# Patient Record
Sex: MALE | Employment: OTHER | URBAN - METROPOLITAN AREA
[De-identification: names, ages, dates, MRNs, and addresses within clinical notes are randomized per-mention and may not be internally consistent; named-entity substitution may affect disease eponyms.]

---

## 2020-01-03 ENCOUNTER — APPOINTMENT (OUTPATIENT)
Dept: GENERAL RADIOLOGY | Facility: CLINIC | Age: 30
End: 2020-01-03
Attending: NURSE PRACTITIONER

## 2020-01-03 ENCOUNTER — HOSPITAL ENCOUNTER (EMERGENCY)
Facility: CLINIC | Age: 30
Discharge: HOME | End: 2020-01-03
Attending: NURSE PRACTITIONER | Admitting: NURSE PRACTITIONER

## 2020-01-03 VITALS
TEMPERATURE: 97 F | SYSTOLIC BLOOD PRESSURE: 155 MMHG | DIASTOLIC BLOOD PRESSURE: 85 MMHG | RESPIRATION RATE: 18 BRPM | OXYGEN SATURATION: 98 % | HEART RATE: 65 BPM

## 2020-01-03 DIAGNOSIS — S69.90XA THUMB INJURY: ICD-10-CM

## 2020-01-03 DIAGNOSIS — T14.8XXA PUNCTURE WOUND: ICD-10-CM

## 2020-01-03 PROCEDURE — 25000128 H RX IP 250 OP 636: Performed by: NURSE PRACTITIONER

## 2020-01-03 PROCEDURE — 90471 IMMUNIZATION ADMIN: CPT

## 2020-01-03 PROCEDURE — 25000132 ZZH RX MED GY IP 250 OP 250 PS 637: Performed by: NURSE PRACTITIONER

## 2020-01-03 PROCEDURE — 73140 X-RAY EXAM OF FINGER(S): CPT | Mod: LT

## 2020-01-03 PROCEDURE — 90715 TDAP VACCINE 7 YRS/> IM: CPT | Performed by: NURSE PRACTITIONER

## 2020-01-03 PROCEDURE — 99284 EMERGENCY DEPT VISIT MOD MDM: CPT

## 2020-01-03 RX ORDER — HYDROCODONE BITARTRATE AND ACETAMINOPHEN 5; 325 MG/1; MG/1
2 TABLET ORAL ONCE
Status: COMPLETED | OUTPATIENT
Start: 2020-01-03 | End: 2020-01-03

## 2020-01-03 RX ORDER — CEPHALEXIN 500 MG/1
500 CAPSULE ORAL 4 TIMES DAILY
Qty: 28 CAPSULE | Refills: 0 | Status: SHIPPED | OUTPATIENT
Start: 2020-01-03 | End: 2020-01-10

## 2020-01-03 RX ORDER — CEPHALEXIN 500 MG/1
500 CAPSULE ORAL ONCE
Status: COMPLETED | OUTPATIENT
Start: 2020-01-03 | End: 2020-01-03

## 2020-01-03 RX ADMIN — CLOSTRIDIUM TETANI TOXOID ANTIGEN (FORMALDEHYDE INACTIVATED), CORYNEBACTERIUM DIPHTHERIAE TOXOID ANTIGEN (FORMALDEHYDE INACTIVATED), BORDETELLA PERTUSSIS TOXOID ANTIGEN (GLUTARALDEHYDE INACTIVATED), BORDETELLA PERTUSSIS FILAMENTOUS HEMAGGLUTININ ANTIGEN (FORMALDEHYDE INACTIVATED), BORDETELLA PERTUSSIS PERTACTIN ANTIGEN, AND BORDETELLA PERTUSSIS FIMBRIAE 2/3 ANTIGEN 0.5 ML: 5; 2; 2.5; 5; 3; 5 INJECTION, SUSPENSION INTRAMUSCULAR at 15:46

## 2020-01-03 RX ADMIN — HYDROCODONE BITARTRATE AND ACETAMINOPHEN 2 TABLET: 5; 325 TABLET ORAL at 15:46

## 2020-01-03 RX ADMIN — CEPHALEXIN 500 MG: 500 CAPSULE ORAL at 16:04

## 2020-01-03 ASSESSMENT — ENCOUNTER SYMPTOMS
COLOR CHANGE: 0
JOINT SWELLING: 1
ARTHRALGIAS: 1
WOUND: 1
NUMBNESS: 0
WEAKNESS: 0

## 2020-01-03 NOTE — ED TRIAGE NOTES
"ABCs intact. Pt screwed a screw into his L thumb. Pt states when he pulled it out there were \"bone fragments\". Last tetanus 1995.  "

## 2020-01-03 NOTE — ED AVS SNAPSHOT
[facility] Emergency Department  [address]  [facility] [address]  Phone:  [phone]  Fax:  [phone]                                    Jackson Ivan   MRN: [id number]    Department:  [facility] Emergency Department   Date of Visit:  1/3/2020           After Visit Summary Signature Page    I have received my discharge instructions, and my questions have been answered. I have discussed any challenges I see with this plan with the nurse or doctor.    ..........................................................................................................................................  Patient/Patient Representative Signature      ..........................................................................................................................................  Patient Representative Print Name and Relationship to Patient    ..................................................               ................................................  Date                                   Time    ..........................................................................................................................................  Reviewed by Signature/Title    ...................................................              ..............................................  Date                                               Time          22EPIC Rev 08/18

## 2020-01-03 NOTE — DISCHARGE INSTRUCTIONS
Ibuprofen 600 mg every six hours for pain.  Return to ED if signs of infection as we discussed.  Where splint until follow up with orthopedics

## 2021-11-14 ENCOUNTER — APPOINTMENT (OUTPATIENT)
Dept: GENERAL RADIOLOGY | Facility: CLINIC | Age: 31
End: 2021-11-14
Attending: EMERGENCY MEDICINE

## 2021-11-14 ENCOUNTER — HOSPITAL ENCOUNTER (EMERGENCY)
Facility: CLINIC | Age: 31
Discharge: HOME | End: 2021-11-15
Attending: EMERGENCY MEDICINE | Admitting: EMERGENCY MEDICINE

## 2021-11-14 ENCOUNTER — APPOINTMENT (OUTPATIENT)
Dept: CT IMAGING | Facility: CLINIC | Age: 31
End: 2021-11-14
Attending: EMERGENCY MEDICINE

## 2021-11-14 DIAGNOSIS — J18.9 PNEUMONIA OF RIGHT UPPER LOBE DUE TO INFECTIOUS ORGANISM: ICD-10-CM

## 2021-11-14 LAB
ANION GAP SERPL CALCULATED.3IONS-SCNC: 6 MMOL/L (ref 3–14)
BASOPHILS # BLD AUTO: 0.1 10E3/UL (ref 0–0.2)
BASOPHILS NFR BLD AUTO: 1 %
BUN SERPL-MCNC: 18 MG/DL (ref 7–30)
CALCIUM SERPL-MCNC: 8.4 MG/DL (ref 8.5–10.1)
CHLORIDE BLD-SCNC: 103 MMOL/L (ref 94–109)
CO2 SERPL-SCNC: 27 MMOL/L (ref 20–32)
CREAT SERPL-MCNC: 0.78 MG/DL (ref 0.66–1.25)
EOSINOPHIL # BLD AUTO: 0.1 10E3/UL (ref 0–0.7)
EOSINOPHIL NFR BLD AUTO: 1 %
ERYTHROCYTE [DISTWIDTH] IN BLOOD BY AUTOMATED COUNT: 11.8 % (ref 10–15)
GFR SERPL CREATININE-BSD FRML MDRD: >90 ML/MIN/1.73M2
GLUCOSE BLD-MCNC: 126 MG/DL (ref 70–99)
HCT VFR BLD AUTO: 40.5 % (ref 40–53)
HGB BLD-MCNC: 13.4 G/DL (ref 13.3–17.7)
IMM GRANULOCYTES # BLD: 0 10E3/UL
IMM GRANULOCYTES NFR BLD: 0 %
LYMPHOCYTES # BLD AUTO: 3.2 10E3/UL (ref 0.8–5.3)
LYMPHOCYTES NFR BLD AUTO: 32 %
MCH RBC QN AUTO: 28.4 PG (ref 26.5–33)
MCHC RBC AUTO-ENTMCNC: 33.1 G/DL (ref 31.5–36.5)
MCV RBC AUTO: 86 FL (ref 78–100)
MONOCYTES # BLD AUTO: 0.7 10E3/UL (ref 0–1.3)
MONOCYTES NFR BLD AUTO: 7 %
NEUTROPHILS # BLD AUTO: 6 10E3/UL (ref 1.6–8.3)
NEUTROPHILS NFR BLD AUTO: 59 %
NRBC # BLD AUTO: 0 10E3/UL
NRBC BLD AUTO-RTO: 0 /100
PLATELET # BLD AUTO: 349 10E3/UL (ref 150–450)
POTASSIUM BLD-SCNC: 3.4 MMOL/L (ref 3.4–5.3)
RBC # BLD AUTO: 4.72 10E6/UL (ref 4.4–5.9)
SODIUM SERPL-SCNC: 136 MMOL/L (ref 133–144)
TROPONIN I SERPL-MCNC: <0.015 UG/L (ref 0–0.04)
WBC # BLD AUTO: 10.2 10E3/UL (ref 4–11)

## 2021-11-14 PROCEDURE — 250N000009 HC RX 250: Performed by: EMERGENCY MEDICINE

## 2021-11-14 PROCEDURE — 71045 X-RAY EXAM CHEST 1 VIEW: CPT

## 2021-11-14 PROCEDURE — 99285 EMERGENCY DEPT VISIT HI MDM: CPT | Mod: 25

## 2021-11-14 PROCEDURE — 93005 ELECTROCARDIOGRAM TRACING: CPT

## 2021-11-14 PROCEDURE — 85025 COMPLETE CBC W/AUTO DIFF WBC: CPT | Performed by: EMERGENCY MEDICINE

## 2021-11-14 PROCEDURE — 250N000013 HC RX MED GY IP 250 OP 250 PS 637: Performed by: EMERGENCY MEDICINE

## 2021-11-14 PROCEDURE — 80048 BASIC METABOLIC PNL TOTAL CA: CPT | Performed by: EMERGENCY MEDICINE

## 2021-11-14 PROCEDURE — 36415 COLL VENOUS BLD VENIPUNCTURE: CPT | Performed by: EMERGENCY MEDICINE

## 2021-11-14 PROCEDURE — 84484 ASSAY OF TROPONIN QUANT: CPT | Performed by: EMERGENCY MEDICINE

## 2021-11-14 PROCEDURE — 250N000011 HC RX IP 250 OP 636: Performed by: EMERGENCY MEDICINE

## 2021-11-14 PROCEDURE — 71275 CT ANGIOGRAPHY CHEST: CPT

## 2021-11-14 PROCEDURE — C9803 HOPD COVID-19 SPEC COLLECT: HCPCS

## 2021-11-14 PROCEDURE — 87636 SARSCOV2 & INF A&B AMP PRB: CPT | Performed by: EMERGENCY MEDICINE

## 2021-11-14 RX ORDER — ACETAMINOPHEN 500 MG
1000 TABLET ORAL ONCE
Status: COMPLETED | OUTPATIENT
Start: 2021-11-14 | End: 2021-11-14

## 2021-11-14 RX ORDER — LEVOFLOXACIN 500 MG/1
500 TABLET, FILM COATED ORAL ONCE
Status: COMPLETED | OUTPATIENT
Start: 2021-11-15 | End: 2021-11-15

## 2021-11-14 RX ORDER — IOPAMIDOL 755 MG/ML
500 INJECTION, SOLUTION INTRAVASCULAR ONCE
Status: COMPLETED | OUTPATIENT
Start: 2021-11-14 | End: 2021-11-14

## 2021-11-14 RX ADMIN — ACETAMINOPHEN 1000 MG: 500 TABLET, FILM COATED ORAL at 21:59

## 2021-11-14 RX ADMIN — IOPAMIDOL 70 ML: 755 INJECTION, SOLUTION INTRAVENOUS at 22:11

## 2021-11-14 RX ADMIN — SODIUM CHLORIDE 85 ML: 9 INJECTION, SOLUTION INTRAVENOUS at 22:11

## 2021-11-15 VITALS
RESPIRATION RATE: 24 BRPM | OXYGEN SATURATION: 94 % | HEART RATE: 62 BPM | DIASTOLIC BLOOD PRESSURE: 65 MMHG | SYSTOLIC BLOOD PRESSURE: 112 MMHG | TEMPERATURE: 98.2 F

## 2021-11-15 LAB
ATRIAL RATE - MUSE: 86 BPM
DIASTOLIC BLOOD PRESSURE - MUSE: NORMAL MMHG
FLUAV RNA SPEC QL NAA+PROBE: NEGATIVE
FLUBV RNA RESP QL NAA+PROBE: NEGATIVE
INTERPRETATION ECG - MUSE: NORMAL
P AXIS - MUSE: 68 DEGREES
PR INTERVAL - MUSE: 132 MS
QRS DURATION - MUSE: 84 MS
QT - MUSE: 334 MS
QTC - MUSE: 399 MS
R AXIS - MUSE: 66 DEGREES
SARS-COV-2 RNA RESP QL NAA+PROBE: NEGATIVE
SYSTOLIC BLOOD PRESSURE - MUSE: NORMAL MMHG
T AXIS - MUSE: 44 DEGREES
VENTRICULAR RATE- MUSE: 86 BPM

## 2021-11-15 PROCEDURE — 250N000013 HC RX MED GY IP 250 OP 250 PS 637: Performed by: EMERGENCY MEDICINE

## 2021-11-15 RX ORDER — ONDANSETRON 4 MG/1
4 TABLET, ORALLY DISINTEGRATING ORAL EVERY 8 HOURS PRN
Qty: 10 TABLET | Refills: 0 | Status: SHIPPED | OUTPATIENT
Start: 2021-11-15 | End: 2021-11-18

## 2021-11-15 RX ORDER — LEVOFLOXACIN 500 MG/1
500 TABLET, FILM COATED ORAL DAILY
Qty: 6 TABLET | Refills: 0 | Status: SHIPPED | OUTPATIENT
Start: 2021-11-15 | End: 2021-11-21

## 2021-11-15 RX ADMIN — LEVOFLOXACIN 500 MG: 500 TABLET, FILM COATED ORAL at 00:16

## 2021-11-15 NOTE — ED PROVIDER NOTES
History     Chief Complaint:    Shortness of Breath    History obtained via professional .  Discharge plan discussed with wife, who is proficient in English speaking and declines an .    MARY Ivan is a 31 year old male who presents for evaluation of cough, shortness of breath, myalgias.  The patient notes that 1 week ago he began to have body aches, malaise, fevers and chills, and cough.  He subsequently also developed shortness of breath and occasional posttussive emesis which is nonbloody and nonbilious.  Contrary to the triage note, the patient denies any black stools but notes darker brown stools and occasionally darker brown phlegm.  He notes a family with similar symptoms months ago who was tested and did not have COVID-19; his wife and 5 kids are asymptomatic.  Endorse chest pain with cough only.  He denies any other concerns.    Review of Systems  General: Positive as per above  Respiratory: Positive as per above  CV: Positive as per above  GI: Positive as per above  All other systems reviewed negative    Allergies:  No Known Allergies      Medications:    None    Past Medical History:    None    Past Surgical History:    None    Family History:    Family member ill with similar symptoms    Social History:  Presents alone    Physical Exam     Patient Vitals for the past 24 hrs:   BP Temp Temp src Pulse Resp SpO2   11/14/21 2100 (!) 145/90 -- -- 101 -- 100 %   11/14/21 2051 (!) 142/84 98.2  F (36.8  C) Temporal 94 24 99 %       Physical Exam  Constitutional: Alert, attentive, frequently coughing  HENT:    Nose: Nose normal.    Mouth/Throat: Oropharynx is clear, mucous membranes are moist   Eyes: EOM are normal.   CV: tachycardic, regular rhythm; no murmurs, rubs or gallups  Chest: Effort normal and breath sounds normal.   GI:  There is no tenderness. No distension. Normal bowel sounds  MSK: Normal range of motion.   Neurological: Alert, attentive  Skin: Skin is warm and  dry.      Emergency Department Course   ECG:  NSR with sinus arrhythmia, no ST/T abnormality, no STEMI, no old EKG for comparison    Results for orders placed or performed during the hospital encounter of 11/14/21 (from the past 24 hour(s))   EKG 12 lead   Result Value Ref Range    Systolic Blood Pressure  mmHg    Diastolic Blood Pressure  mmHg    Ventricular Rate 86 BPM    Atrial Rate 86 BPM    NJ Interval 132 ms    QRS Duration 84 ms     ms    QTc 399 ms    P Axis 68 degrees    R AXIS 66 degrees    T Axis 44 degrees    Interpretation ECG       Sinus rhythm with sinus arrhythmia  Normal ECG  No previous ECGs available     CBC + differential    Narrative    The following orders were created for panel order CBC + differential.  Procedure                               Abnormality         Status                     ---------                               -----------         ------                     CBC with platelets and d...[[id number]]                      Final result                 Please view results for these tests on the individual orders.   Basic metabolic panel (BMP)   Result Value Ref Range    Sodium 136 133 - 144 mmol/L    Potassium 3.4 3.4 - 5.3 mmol/L    Chloride 103 94 - 109 mmol/L    Carbon Dioxide (CO2) 27 20 - 32 mmol/L    Anion Gap 6 3 - 14 mmol/L    Urea Nitrogen 18 7 - 30 mg/dL    Creatinine 0.78 0.66 - 1.25 mg/dL    Calcium 8.4 (L) 8.5 - 10.1 mg/dL    Glucose 126 (H) 70 - 99 mg/dL    GFR Estimate >90 >60 mL/min/1.73m2   Troponin I (now)   Result Value Ref Range    Troponin I <0.015 0.000 - 0.045 ug/L   CBC with platelets and differential   Result Value Ref Range    WBC Count 10.2 4.0 - 11.0 10e3/uL    RBC Count 4.72 4.40 - 5.90 10e6/uL    Hemoglobin 13.4 13.3 - 17.7 g/dL    Hematocrit 40.5 40.0 - 53.0 %    MCV 86 78 - 100 fL    MCH 28.4 26.5 - 33.0 pg    MCHC 33.1 31.5 - 36.5 g/dL    RDW 11.8 10.0 - 15.0 %    Platelet Count 349 150 - 450 10e3/uL    % Neutrophils 59 %    % Lymphocytes 32 %     % Monocytes 7 %    % Eosinophils 1 %    % Basophils 1 %    % Immature Granulocytes 0 %    NRBCs per 100 WBC 0 <1 /100    Absolute Neutrophils 6.0 1.6 - 8.3 10e3/uL    Absolute Lymphocytes 3.2 0.8 - 5.3 10e3/uL    Absolute Monocytes 0.7 0.0 - 1.3 10e3/uL    Absolute Eosinophils 0.1 0.0 - 0.7 10e3/uL    Absolute Basophils 0.1 0.0 - 0.2 10e3/uL    Absolute Immature Granulocytes 0.0 <=0.0 10e3/uL    Absolute NRBCs 0.0 10e3/uL   XR Chest Port 1 View    Narrative    EXAM: XR CHEST PORT 1 VIEW  LOCATION: [facility]  DATE/TIME: 11/14/2021 9:22 PM    INDICATION: dyspnea  COMPARISON: None.      Impression    IMPRESSION:     Asymmetric opacity in the right apex at the level of the right first and second ribs either representing a lung consolidation or focal lesion. Consider further evaluation with dedicated PA and lateral views of the chest and if the finding persists,   characterization with CT is suggested.    Left lung is well-expanded and clear. Normal lung vascularity.    Normal heart and mediastinal contours.    NOTE: ABNORMAL REPORT    THE DICTATION ABOVE DESCRIBES AN ABNORMALITY FOR WHICH FOLLOW-UP IS NEEDED.    CT Chest Pulmonary Embolism w Contrast    Narrative    EXAM: CT CHEST PULMONARY EMBOLISM W CONTRAST  LOCATION: [facility]  DATE/TIME: 11/14/2021 10:08 PM    INDICATION: Dyspnea, chest pain, abnormal chest x-ray.  COMPARISON: Chest x-ray from 11/14/2021.  TECHNIQUE: CT chest pulmonary angiogram during arterial phase injection of IV contrast. Multiplanar reformats and MIP reconstructions were performed. Dose reduction techniques were used.   CONTRAST: 70 mL Isovue-370.    FINDINGS:  ANGIOGRAM CHEST: Pulmonary arteries are normal caliber and negative for pulmonary emboli. Thoracic aorta is negative for dissection. No CT evidence of right heart strain.    LUNGS AND PLEURA: There is a dense masslike area of consolidation in the right upper lobe posteriorly accounting  for the findings seen on the recent chest x-ray. Given the patient's age, this is most likely due to focal pneumonitis and clinical   correlation is recommended. Follow-up is needed to ensure resolution of this process and exclude any underlying lesion. Left lung is clear. No pleural effusions.    MEDIASTINUM/AXILLAE: Mild right hilar adenopathy is nonspecific but favored to be reactive in nature. This can be monitored on follow-up. No adenopathy elsewhere. Normal heart size. No pericardial effusion. Normal caliber thoracic aorta. Esophagus is   grossly negative.    CORONARY ARTERY CALCIFICATION: None.    UPPER ABDOMEN: Normal.    MUSCULOSKELETAL: Normal.      Impression    IMPRESSION:  1.  Negative for pulmonary embolism.    2.  Dense masslike area of consolidation right upper lobe posteriorly corresponding with the finding seen on the recent chest x-ray. This is favored to be most likely due to a focal area of pneumonitis and clinical correlation is needed.    3.  Mild right hilar adenopathy is nonspecific but most likely reactive in nature.    4.  No significant findings elsewhere.    5.  Recommend follow-up CT scan in 3 months following appropriate therapy to monitor for resolution and exclude any underlying lesion. Follow up sooner if symptoms indicate.       Emergency Department Course:    Reviewed:    I reviewed nursing notes, vitals and past history    Assessments:   I obtained history and examined the patient as noted above.     I rechecked the patient and explained findings.     Interventions:  Medications   acetaminophen (TYLENOL) tablet 1,000 mg (1,000 mg Oral Given 11/14/21 2159)   iopamidol (ISOVUE-370) solution 500 mL (70 mLs Intravenous Given 11/14/21 2211)   Sodium Chloride 0.9 % Bag 500mL for CT Scan Flush Use (85 mLs Intravenous Given 11/14/21 2211)   levofloxacin (LEVAQUIN) tablet 500 mg (500 mg Oral Given 11/15/21 0016)       Disposition:  The patient was discharged to home.    Vitals:     11/14/21 2200 11/14/21 2300 11/14/21 2330 11/15/21 0000   BP: 133/85 119/76 114/68 112/65   Pulse: 91  67 62   Resp:       Temp:       TempSrc:       SpO2: 98%  96% 94%       Impression & Plan      Medical Decision Making:  This is a 31-year-old male who presents for evaluation of influenza-like illness.  Differential includes COVID-19, pneumonia, influenza, among others.  The patient does have intermittent tachycardia while coughing but otherwise has normal stable vital signs, normal work of breathing, normal oxygen saturation.  Initial chest x-ray shows apparent right upper lobe infiltrate but CT is recommended given findings.  CT shows likely pneumonitis but I discussed the need for follow-up imaging with the patient and his wife, who speaks English.  COVID-19 testing is pending, but suspect bacterial process given the above.  Will treat with Levaquin, which was started in the department.  Screening labs show no abnormality regarding troponin, CBC, electrolytes.  Plan empiric therapy as per above, primary care follow-up in 2 to 3 days, and return precaution for difficulty breathing, chest pain, or any other concerns.        Diagnosis:    ICD-10-CM    1. Pneumonia of right upper lobe due to infectious organism  J18.9        Discharge Medications:  New Prescriptions    LEVOFLOXACIN (LEVAQUIN) 500 MG TABLET    Take 1 tablet (500 mg) by mouth daily for 6 days    ONDANSETRON (ZOFRAN ODT) 4 MG ODT TAB    Take 1 tablet (4 mg) by mouth every 8 hours as needed for nausea          Manuel Dyer MD  11/15/21 0037

## 2021-11-15 NOTE — ED TRIAGE NOTES
Cough and body aches x 2-3 weeks.  Worsening SOB and chest pressure over last several days.  Pt also states that he has had black tarry stools x 1 week.

## 2021-12-09 ENCOUNTER — HOSPITAL ENCOUNTER (EMERGENCY)
Facility: CLINIC | Age: 31
Discharge: HOME | End: 2021-12-09
Attending: EMERGENCY MEDICINE | Admitting: EMERGENCY MEDICINE
Payer: OTHER GOVERNMENT

## 2021-12-09 ENCOUNTER — APPOINTMENT (OUTPATIENT)
Dept: GENERAL RADIOLOGY | Facility: CLINIC | Age: 31
End: 2021-12-09
Attending: EMERGENCY MEDICINE
Payer: OTHER GOVERNMENT

## 2021-12-09 VITALS
WEIGHT: 152 LBS | RESPIRATION RATE: 18 BRPM | OXYGEN SATURATION: 97 % | TEMPERATURE: 98.5 F | SYSTOLIC BLOOD PRESSURE: 114 MMHG | DIASTOLIC BLOOD PRESSURE: 69 MMHG | BODY MASS INDEX: 20.59 KG/M2 | HEIGHT: 72 IN | HEART RATE: 72 BPM

## 2021-12-09 DIAGNOSIS — A09 DIARRHEA OF INFECTIOUS ORIGIN: ICD-10-CM

## 2021-12-09 DIAGNOSIS — R50.9 FEVER, UNSPECIFIED FEVER CAUSE: ICD-10-CM

## 2021-12-09 LAB
ANION GAP SERPL CALCULATED.3IONS-SCNC: 6 MMOL/L (ref 3–14)
BASOPHILS # BLD AUTO: 0.1 10E3/UL (ref 0–0.2)
BASOPHILS NFR BLD AUTO: 0 %
BUN SERPL-MCNC: 14 MG/DL (ref 7–30)
C DIFF TOX B STL QL: NEGATIVE
CALCIUM SERPL-MCNC: 8.6 MG/DL (ref 8.5–10.1)
CHLORIDE BLD-SCNC: 106 MMOL/L (ref 94–109)
CO2 SERPL-SCNC: 25 MMOL/L (ref 20–32)
CREAT SERPL-MCNC: 0.78 MG/DL (ref 0.66–1.25)
DEPRECATED S PYO AG THROAT QL EIA: NEGATIVE
EOSINOPHIL # BLD AUTO: 0.1 10E3/UL (ref 0–0.7)
EOSINOPHIL NFR BLD AUTO: 0 %
ERYTHROCYTE [DISTWIDTH] IN BLOOD BY AUTOMATED COUNT: 12.7 % (ref 10–15)
FLUAV RNA SPEC QL NAA+PROBE: NEGATIVE
FLUBV RNA RESP QL NAA+PROBE: NEGATIVE
GFR SERPL CREATININE-BSD FRML MDRD: >90 ML/MIN/1.73M2
GLUCOSE BLD-MCNC: 113 MG/DL (ref 70–99)
GROUP A STREP BY PCR: DETECTED
HCT VFR BLD AUTO: 40.1 % (ref 40–53)
HGB BLD-MCNC: 13.3 G/DL (ref 13.3–17.7)
HOLD SPECIMEN: NORMAL
HOLD SPECIMEN: NORMAL
IMM GRANULOCYTES # BLD: 0 10E3/UL
IMM GRANULOCYTES NFR BLD: 0 %
LYMPHOCYTES # BLD AUTO: 1.5 10E3/UL (ref 0.8–5.3)
LYMPHOCYTES NFR BLD AUTO: 13 %
MCH RBC QN AUTO: 28.7 PG (ref 26.5–33)
MCHC RBC AUTO-ENTMCNC: 33.2 G/DL (ref 31.5–36.5)
MCV RBC AUTO: 86 FL (ref 78–100)
MONOCYTES # BLD AUTO: 1 10E3/UL (ref 0–1.3)
MONOCYTES NFR BLD AUTO: 8 %
NEUTROPHILS # BLD AUTO: 9.4 10E3/UL (ref 1.6–8.3)
NEUTROPHILS NFR BLD AUTO: 79 %
NRBC # BLD AUTO: 0 10E3/UL
NRBC BLD AUTO-RTO: 0 /100
PLATELET # BLD AUTO: 210 10E3/UL (ref 150–450)
POTASSIUM BLD-SCNC: 3.6 MMOL/L (ref 3.4–5.3)
RBC # BLD AUTO: 4.64 10E6/UL (ref 4.4–5.9)
SARS-COV-2 RNA RESP QL NAA+PROBE: NEGATIVE
SODIUM SERPL-SCNC: 137 MMOL/L (ref 133–144)
WBC # BLD AUTO: 12 10E3/UL (ref 4–11)

## 2021-12-09 PROCEDURE — 87636 SARSCOV2 & INF A&B AMP PRB: CPT | Performed by: EMERGENCY MEDICINE

## 2021-12-09 PROCEDURE — 87651 STREP A DNA AMP PROBE: CPT | Performed by: EMERGENCY MEDICINE

## 2021-12-09 PROCEDURE — 71046 X-RAY EXAM CHEST 2 VIEWS: CPT

## 2021-12-09 PROCEDURE — 87040 BLOOD CULTURE FOR BACTERIA: CPT | Performed by: EMERGENCY MEDICINE

## 2021-12-09 PROCEDURE — 36415 COLL VENOUS BLD VENIPUNCTURE: CPT | Performed by: EMERGENCY MEDICINE

## 2021-12-09 PROCEDURE — 99284 EMERGENCY DEPT VISIT MOD MDM: CPT | Mod: 25

## 2021-12-09 PROCEDURE — 258N000003 HC RX IP 258 OP 636: Performed by: EMERGENCY MEDICINE

## 2021-12-09 PROCEDURE — 80048 BASIC METABOLIC PNL TOTAL CA: CPT | Performed by: EMERGENCY MEDICINE

## 2021-12-09 PROCEDURE — 85025 COMPLETE CBC W/AUTO DIFF WBC: CPT | Performed by: EMERGENCY MEDICINE

## 2021-12-09 PROCEDURE — 250N000013 HC RX MED GY IP 250 OP 250 PS 637: Performed by: EMERGENCY MEDICINE

## 2021-12-09 PROCEDURE — C9803 HOPD COVID-19 SPEC COLLECT: HCPCS

## 2021-12-09 PROCEDURE — 96360 HYDRATION IV INFUSION INIT: CPT

## 2021-12-09 PROCEDURE — 87493 C DIFF AMPLIFIED PROBE: CPT | Performed by: EMERGENCY MEDICINE

## 2021-12-09 RX ORDER — SODIUM CHLORIDE 9 MG/ML
INJECTION, SOLUTION INTRAVENOUS CONTINUOUS
Status: DISCONTINUED | OUTPATIENT
Start: 2021-12-09 | End: 2021-12-09 | Stop reason: HOSPADM

## 2021-12-09 RX ORDER — ACETAMINOPHEN 325 MG/1
975 TABLET ORAL ONCE
Status: COMPLETED | OUTPATIENT
Start: 2021-12-09 | End: 2021-12-09

## 2021-12-09 RX ADMIN — ACETAMINOPHEN 975 MG: 325 TABLET, FILM COATED ORAL at 04:25

## 2021-12-09 RX ADMIN — SODIUM CHLORIDE 1000 ML: 9 INJECTION, SOLUTION INTRAVENOUS at 04:25

## 2021-12-09 ASSESSMENT — MIFFLIN-ST. JEOR: SCORE: 1672.47

## 2021-12-09 ASSESSMENT — ENCOUNTER SYMPTOMS
MYALGIAS: 1
DIARRHEA: 1
FEVER: 1
WEAKNESS: 1
SORE THROAT: 1
COUGH: 0

## 2021-12-09 NOTE — ED PROVIDER NOTES
History   Chief Complaint:  Shortness of Breath     The history is provided by the patient and the spouse. No  was used (wife interpreted).      Jackson Ivan is a 31 year old male with history of pneumonia who presents with shortness of breath. The patient reports that for the past few days he has been having fatigue, chest pain, myalgias and a fever. The patient had similar symptoms on 11/14 when he presented to the emergency department and was diagnosed with pneumonia. He was prescribed Levaquin. His wife notes that after beginning this medication the patient started to have diarrhea and has continued to have diarrhea after stopping it. Tonight the patient took Tylenol for his symptoms with minimal improvement. The patient also mentions having a sore throat. He denies any cough.     Review of Systems   Constitutional: Positive for fever.   HENT: Positive for sore throat.    Respiratory: Negative for cough.    Cardiovascular: Positive for chest pain.   Gastrointestinal: Positive for diarrhea.   Musculoskeletal: Positive for myalgias.   Neurological: Positive for weakness.   All other systems reviewed and are negative.        Allergies:  The patient has no known allergies.     Medications:  The patient is not currently taking any prescribed medications.    Past Medical History:     Pneumonia      Past Surgical History:    The patient denies past surgical history.     Family History:    The patient denies past family history.     Social History:  The patient presents with his wife.     Physical Exam     Patient Vitals for the past 24 hrs:   BP Temp Temp src Pulse Resp SpO2 Height Weight   12/09/21 0530 114/69 -- -- 72 -- 97 % -- --   12/09/21 0515 -- -- -- -- -- 96 % -- --   12/09/21 0500 -- 98.5  F (36.9  C) Oral -- -- -- -- --   12/09/21 0445 -- -- -- -- -- 97 % -- --   12/09/21 0430 119/74 -- -- 94 -- 97 % -- --   12/09/21 0358 126/81 (!) 101.9  F (38.8  C) Oral 95 18 96 % 1.829 m (6')  68.9 kg (152 lb)       Physical Exam  Vitals and nursing note reviewed.   HENT:      Head: Normocephalic.      Mouth/Throat:      Pharynx: No pharyngeal swelling or oropharyngeal exudate.   Eyes:      Pupils: Pupils are equal, round, and reactive to light.   Cardiovascular:      Rate and Rhythm: Normal rate and regular rhythm.   Pulmonary:      Effort: Pulmonary effort is normal.      Breath sounds: Normal breath sounds.   Abdominal:      Palpations: Abdomen is soft.   Musculoskeletal:         General: Normal range of motion.      Cervical back: Normal range of motion.   Skin:     General: Skin is warm.      Capillary Refill: Capillary refill takes less than 2 seconds.   Neurological:      General: No focal deficit present.      Mental Status: He is alert.   Psychiatric:         Mood and Affect: Mood is anxious.         Emergency Department Course     Imaging:  XR Chest PA & LAT  1. Near-complete interval resolution of what was a patchy opacity in the upper right lung on the comparison study was likely infectious or inflammatory in etiology.  2. No other findings suspicious for active cardiopulmonary disease.       Reading per radiology.    Laboratory:  CBC: WBC 12.0 (H), HGB 13.3,    BMP: Glucose 113 (H) o/w WNL (Creatinine 0.78)     Symptomatic Influenza A/B & SARS-CoV2 (COVID19) Virus PCR Multiplex: Negative    Rapid Strep Test: Negative     Strep Culture: Pending   Clostridium difficile toxin B PCR: Pending  Blood Culture x2: Pending     Emergency Department Course:  Reviewed:  I reviewed nursing notes, vitals and past medical history    Assessments:  0413 I obtained history and examined the patient as noted above.   0535 I rechecked the patient and explained findings.     Interventions:  0425 NS, 1 L, IV bolus   0425 Tylenol 975 mg Oral     Disposition:  The patient was discharged to home.     Impression & Plan     Medical Decision Making:  Patient presents with body aches headache and fever.   Antipyretics and IV fluids were sent no clinical concerns for sepsis with normal blood pressure normal heart rate.  Due to history of pneumonia chest x-ray is repeated and shows resolution of prior infiltrate.  Lab work is unremarkable other than very slight leukocytosis.  Patient has had diarrhea since Levaquin.  Differential gnosis could include C. difficile but also conclude viral illness with just antibiotic associated diarrhea.  Stool testing was performed for now we will discharge with oral hydration antipyretics and no need for antibiotics blood cultures are and C. difficile testing is pending at the time of discharge.    Diagnosis:    ICD-10-CM    1. Diarrhea of infectious origin  A09    2. Fever, unspecified fever cause  R50.9        Discharge Medications:  None    Scribe Disclosure:  Daryn BISHOP, am serving as a scribe at 4:03 AM on 12/9/2021 to document services personally performed by Irvin Valle MD based on my observations and the provider's statements to me.              Irvin Valle MD  12/10/21 2034

## 2021-12-09 NOTE — DISCHARGE INSTRUCTIONS
Due to your recent history of pneumonia we have repeated an x-ray and is shown that your pneumonia has resolved.  You did have a fever here your Covid and influenza tests are negative.  We have recommended a C. difficile test due to your recent use of Levaquin and now diarrhea.  Please continue oral hydration at home okay to continue Tylenol or ibuprofen for fever or body aches.  We will call you if your stool test is positive for C. difficile and recommend treatment.  Return with severe increase in shortness of breath or severe increase in abdominal pain or bloody diarrhea.

## 2021-12-09 NOTE — ED TRIAGE NOTES
Here for sob started 2 nights ago associated with central chest pain, fever, chills, tonsils swelling, hurts to swallow, and body aches. Stated that he was seen here 1 month ago for similar symptoms due to pneumonia. Tylenol was given at 8pm. ABCs intact.

## 2021-12-14 LAB
BACTERIA BLD CULT: NO GROWTH
BACTERIA BLD CULT: NO GROWTH

## 2022-03-17 ENCOUNTER — HOSPITAL ENCOUNTER (EMERGENCY)
Facility: CLINIC | Age: 32
Discharge: HOME | End: 2022-03-17
Attending: EMERGENCY MEDICINE | Admitting: EMERGENCY MEDICINE

## 2022-03-17 ENCOUNTER — APPOINTMENT (OUTPATIENT)
Dept: GENERAL RADIOLOGY | Facility: CLINIC | Age: 32
End: 2022-03-17
Attending: EMERGENCY MEDICINE

## 2022-03-17 VITALS
RESPIRATION RATE: 20 BRPM | OXYGEN SATURATION: 100 % | TEMPERATURE: 98.2 F | DIASTOLIC BLOOD PRESSURE: 70 MMHG | SYSTOLIC BLOOD PRESSURE: 138 MMHG | HEART RATE: 90 BPM

## 2022-03-17 DIAGNOSIS — S60.459A FOREIGN BODY OF FINGER: ICD-10-CM

## 2022-03-17 DIAGNOSIS — S62.651A CLOSED NONDISPLACED FRACTURE OF MIDDLE PHALANX OF LEFT INDEX FINGER, INITIAL ENCOUNTER: ICD-10-CM

## 2022-03-17 PROCEDURE — 250N000011 HC RX IP 250 OP 636: Performed by: EMERGENCY MEDICINE

## 2022-03-17 PROCEDURE — 73140 X-RAY EXAM OF FINGER(S): CPT | Mod: LT

## 2022-03-17 PROCEDURE — 90715 TDAP VACCINE 7 YRS/> IM: CPT | Performed by: EMERGENCY MEDICINE

## 2022-03-17 PROCEDURE — 96365 THER/PROPH/DIAG IV INF INIT: CPT

## 2022-03-17 PROCEDURE — 99284 EMERGENCY DEPT VISIT MOD MDM: CPT | Mod: 25

## 2022-03-17 PROCEDURE — 90471 IMMUNIZATION ADMIN: CPT | Performed by: EMERGENCY MEDICINE

## 2022-03-17 PROCEDURE — 73140 X-RAY EXAM OF FINGER(S): CPT | Mod: LT,76

## 2022-03-17 RX ORDER — CEFAZOLIN SODIUM 2 G/100ML
2 INJECTION, SOLUTION INTRAVENOUS ONCE
Status: COMPLETED | OUTPATIENT
Start: 2022-03-17 | End: 2022-03-17

## 2022-03-17 RX ORDER — BUPIVACAINE HYDROCHLORIDE 5 MG/ML
INJECTION, SOLUTION PERINEURAL
Status: DISCONTINUED
Start: 2022-03-17 | End: 2022-03-17 | Stop reason: HOSPADM

## 2022-03-17 RX ORDER — CEPHALEXIN 500 MG/1
500 CAPSULE ORAL 4 TIMES DAILY
Qty: 20 CAPSULE | Refills: 0 | Status: SHIPPED | OUTPATIENT
Start: 2022-03-17 | End: 2022-03-22

## 2022-03-17 RX ADMIN — CLOSTRIDIUM TETANI TOXOID ANTIGEN (FORMALDEHYDE INACTIVATED), CORYNEBACTERIUM DIPHTHERIAE TOXOID ANTIGEN (FORMALDEHYDE INACTIVATED), BORDETELLA PERTUSSIS TOXOID ANTIGEN (GLUTARALDEHYDE INACTIVATED), BORDETELLA PERTUSSIS FILAMENTOUS HEMAGGLUTININ ANTIGEN (FORMALDEHYDE INACTIVATED), BORDETELLA PERTUSSIS PERTACTIN ANTIGEN, AND BORDETELLA PERTUSSIS FIMBRIAE 2/3 ANTIGEN 0.5 ML: 5; 2; 2.5; 5; 3; 5 INJECTION, SUSPENSION INTRAMUSCULAR at 17:35

## 2022-03-17 RX ADMIN — CEFAZOLIN SODIUM 2 G: 2 INJECTION, SOLUTION INTRAVENOUS at 17:35

## 2022-03-17 ASSESSMENT — ENCOUNTER SYMPTOMS: WOUND: 1

## 2022-03-17 NOTE — ED PROVIDER NOTES
History   Chief Complaint:  Nail through finger       The history is provided by the patient.      Jackson Song is a 31 year old male who presents with a nail through finger. The patient reports that his nail gun discharged twice accidentally, sending one nail through his left second digit about 30 minutes ago.  He had immediate onset of pain that is aggravated by touch or movement of the finger.  He does not know when his last tetanus shot was.       Review of Systems   Skin: Positive for wound (2nd left digit).   All other systems reviewed and are negative.      Allergies:  No allergies on file.    Medications:  No current outpatient medications on file.    Past Medical History:     No past medical history on file.      Past Surgical History:    No past surgical history on file.     Family History:    No family history on file.    Social History:  The patient presents to the ED with a coworker.    Physical Exam     Patient Vitals for the past 24 hrs:   BP Temp Temp src Pulse Resp SpO2   03/17/22 1615 (!) 144/78 -- -- -- -- --   03/17/22 1609 (!) 161/97 98.2  F (36.8  C) Temporal 98 28 100 %       Physical Exam    EYES:   Conjunctiva normal.  NECK:    Supple, no meningismus.   CV:     Regular rate and rhythm     No murmurs, rubs or gallops.       2+ radial pulse on left  PULM:    Clear to auscultation bilateral.       No respiratory distress.      No wheezing, rales or stridor.  MSK:     Left index finger: no rotational deformity.      Construction nail present extending from the distal middle phalanx/DIP joint coming from the lateral to medial aspect of the finger      No subungual hematoma or nail injury.  LYMPH:   No cervical lymphadenopathy.  NEURO:   Alert and oriented x3     Speech is clear  SKIN:    Warm, dry   PSYCH:    Mood is good and affect is appropriate.      Emergency Department Course     Imaging:  Fingers XR, 2-3 views, left   Final Result   IMPRESSION:   1.  Interval removal of the  metallic nail from the left second finger.   2.  Subtle fracture of the palmar margin of the second finger middle phalanx neck with a thin bone fragment in this region.   3.  Normal joint spacing and alignment.   4.  Second finger soft tissue swelling and scattered gas.         Fingers XR, 2-3 views, left   Final Result   IMPRESSION:   1.  Metallic nail extending through the palmar tissues of the second   finger at the level of the middle phalanx head.   2.  No definitive evidence of nail extension through the second middle   phalanx; no fracture.   3.  No joint malalignment.      HA BAUTISTA MD            SYSTEM ID:  [id number]        Report per radiology      Procedure:  Foreign body removal  Performed by: Man Rodrigues M.D.  SITE: Left index finger  INDICATIONS: Construction nail had left index finger  ANESTHESIA: Digital block with 0.5% bupivacaine without epinephrine utilizing 4 mL  TECHNIQUE: The nail head was grasped with a pair of pliers applying traction and rotational force.  The nail was removed in its entirety with no clinical residual foreign body.  COMPLICATIONS:  Patient tolerated the procedure well and there were no complications.    Emergency Department Course:         Reviewed:  I reviewed nursing notes, vitals and past medical history    Assessments:  1613 I obtained history and examined the patient as noted above.   1614 I applied local anesthetic to the patient's left second digit.   1640 I removed the nail from the patient's left second digit.  On reexamination, the FDS, FDP and extensor tendons are within normal limits.  He has full range of motion of the index finger.    Interventions:  1735 Adacel (Tdap) 0.5 mL IM  1812 Ancef 2 g IV    Disposition:  The patient was discharged to home.     Impression & Plan     Medical Decision Makin-year-old male presented to the ED with nail gun injury to the left index finger in which construction nail was embedded near the distal aspect of  the middle phalanx and DIP joint.  Tetanus was updated.  Initial x-rays appeared that the nail was in the soft tissue alone.  A significant amount of force was applied to remove the nail drawing concern for underlying bony injury.  A repeat x-ray was performed revealing no residual foreign body although there was a small nondisplaced fracture of the distal aspect of the middle phalanx.  This was treated as an open fracture.  Patient was given 2 g of Ancef and he will be discharged home on cephalexin.  Close follow-up with orthopedic surgery.  Patient was placed in AlumaFoam splint.  General wound care instructions provided.    Diagnosis:    ICD-10-CM    1. Foreign body of finger  S60.459A    2. Closed nondisplaced fracture of middle phalanx of left index finger, initial encounter  S62.651A        Scribe Disclosure:  I, Neil Singh, am serving as a scribe at 4:14 PM on 3/17/2022 to document services personally performed by Man Rodrigues MD based on my observations and the provider's statements to me.          Man Rodrigues MD  03/17/22 1917